# Patient Record
Sex: FEMALE | Race: WHITE | Employment: FULL TIME | URBAN - METROPOLITAN AREA
[De-identification: names, ages, dates, MRNs, and addresses within clinical notes are randomized per-mention and may not be internally consistent; named-entity substitution may affect disease eponyms.]

---

## 2017-02-28 ENCOUNTER — OP HISTORICAL/CONVERTED ENCOUNTER (OUTPATIENT)
Dept: OTHER | Age: 27
End: 2017-02-28

## 2017-03-10 ENCOUNTER — OP HISTORICAL/CONVERTED ENCOUNTER (OUTPATIENT)
Dept: OTHER | Age: 27
End: 2017-03-10

## 2018-04-19 ENCOUNTER — HOSPITAL ENCOUNTER (OUTPATIENT)
Age: 28
Setting detail: OUTPATIENT SURGERY
Discharge: HOME | End: 2018-04-19
Attending: INTERNAL MEDICINE | Admitting: INTERNAL MEDICINE
Payer: OTHER GOVERNMENT

## 2018-04-19 ENCOUNTER — ANESTHESIA (OUTPATIENT)
Dept: ENDOSCOPY | Age: 28
End: 2018-04-19
Payer: OTHER GOVERNMENT

## 2018-04-19 ENCOUNTER — ANESTHESIA EVENT (OUTPATIENT)
Dept: ENDOSCOPY | Age: 28
End: 2018-04-19
Payer: OTHER GOVERNMENT

## 2018-04-19 VITALS
DIASTOLIC BLOOD PRESSURE: 55 MMHG | OXYGEN SATURATION: 100 % | HEART RATE: 66 BPM | TEMPERATURE: 97.6 F | SYSTOLIC BLOOD PRESSURE: 105 MMHG | RESPIRATION RATE: 18 BRPM

## 2018-04-19 LAB
H PYLORI FROM TISSUE: NEGATIVE
HCG UR QL: NEGATIVE
KIT LOT NO., HCLOLOT: NORMAL
NEGATIVE CONTROL: NEGATIVE
POSITIVE CONTROL: POSITIVE

## 2018-04-19 PROCEDURE — 88305 TISSUE EXAM BY PATHOLOGIST: CPT | Performed by: INTERNAL MEDICINE

## 2018-04-19 PROCEDURE — 74011250636 HC RX REV CODE- 250/636

## 2018-04-19 PROCEDURE — 74011000250 HC RX REV CODE- 250

## 2018-04-19 PROCEDURE — 76060000031 HC ANESTHESIA FIRST 0.5 HR: Performed by: INTERNAL MEDICINE

## 2018-04-19 PROCEDURE — 77030009426 HC FCPS BIOP ENDOSC BSC -B: Performed by: INTERNAL MEDICINE

## 2018-04-19 PROCEDURE — 74011000258 HC RX REV CODE- 258

## 2018-04-19 PROCEDURE — 81025 URINE PREGNANCY TEST: CPT

## 2018-04-19 PROCEDURE — 87077 CULTURE AEROBIC IDENTIFY: CPT | Performed by: INTERNAL MEDICINE

## 2018-04-19 PROCEDURE — 76040000019: Performed by: INTERNAL MEDICINE

## 2018-04-19 RX ORDER — PROPOFOL 10 MG/ML
INJECTION, EMULSION INTRAVENOUS AS NEEDED
Status: DISCONTINUED | OUTPATIENT
Start: 2018-04-19 | End: 2018-04-19 | Stop reason: HOSPADM

## 2018-04-19 RX ORDER — FLUMAZENIL 0.1 MG/ML
0.2 INJECTION INTRAVENOUS
Status: DISCONTINUED | OUTPATIENT
Start: 2018-04-19 | End: 2018-04-19 | Stop reason: HOSPADM

## 2018-04-19 RX ORDER — EPINEPHRINE 0.1 MG/ML
1 INJECTION INTRACARDIAC; INTRAVENOUS
Status: DISCONTINUED | OUTPATIENT
Start: 2018-04-19 | End: 2018-04-19 | Stop reason: HOSPADM

## 2018-04-19 RX ORDER — CHOLECALCIFEROL (VITAMIN D3) 125 MCG
CAPSULE ORAL
COMMUNITY

## 2018-04-19 RX ORDER — DEXTROMETHORPHAN/PSEUDOEPHED 2.5-7.5/.8
1.2 DROPS ORAL
Status: DISCONTINUED | OUTPATIENT
Start: 2018-04-19 | End: 2018-04-19 | Stop reason: HOSPADM

## 2018-04-19 RX ORDER — SODIUM CHLORIDE 0.9 % (FLUSH) 0.9 %
5-10 SYRINGE (ML) INJECTION EVERY 8 HOURS
Status: DISCONTINUED | OUTPATIENT
Start: 2018-04-19 | End: 2018-04-19 | Stop reason: HOSPADM

## 2018-04-19 RX ORDER — NALOXONE HYDROCHLORIDE 0.4 MG/ML
0.4 INJECTION, SOLUTION INTRAMUSCULAR; INTRAVENOUS; SUBCUTANEOUS
Status: DISCONTINUED | OUTPATIENT
Start: 2018-04-19 | End: 2018-04-19 | Stop reason: HOSPADM

## 2018-04-19 RX ORDER — ACYCLOVIR 200 MG/1
200 CAPSULE ORAL
COMMUNITY

## 2018-04-19 RX ORDER — SODIUM CHLORIDE 9 MG/ML
INJECTION, SOLUTION INTRAVENOUS
Status: DISCONTINUED | OUTPATIENT
Start: 2018-04-19 | End: 2018-04-19 | Stop reason: HOSPADM

## 2018-04-19 RX ORDER — LIDOCAINE HYDROCHLORIDE 20 MG/ML
INJECTION, SOLUTION EPIDURAL; INFILTRATION; INTRACAUDAL; PERINEURAL AS NEEDED
Status: DISCONTINUED | OUTPATIENT
Start: 2018-04-19 | End: 2018-04-19 | Stop reason: HOSPADM

## 2018-04-19 RX ORDER — ATROPINE SULFATE 0.1 MG/ML
0.5 INJECTION INTRAVENOUS
Status: DISCONTINUED | OUTPATIENT
Start: 2018-04-19 | End: 2018-04-19 | Stop reason: HOSPADM

## 2018-04-19 RX ORDER — PAROXETINE HYDROCHLORIDE 20 MG/1
10 TABLET, FILM COATED ORAL DAILY
COMMUNITY

## 2018-04-19 RX ORDER — SODIUM CHLORIDE 0.9 % (FLUSH) 0.9 %
5-10 SYRINGE (ML) INJECTION AS NEEDED
Status: DISCONTINUED | OUTPATIENT
Start: 2018-04-19 | End: 2018-04-19 | Stop reason: HOSPADM

## 2018-04-19 RX ADMIN — LIDOCAINE HYDROCHLORIDE 60 MG: 20 INJECTION, SOLUTION EPIDURAL; INFILTRATION; INTRACAUDAL; PERINEURAL at 15:57

## 2018-04-19 RX ADMIN — PROPOFOL 50 MG: 10 INJECTION, EMULSION INTRAVENOUS at 15:58

## 2018-04-19 RX ADMIN — PROPOFOL 50 MG: 10 INJECTION, EMULSION INTRAVENOUS at 15:57

## 2018-04-19 RX ADMIN — PROPOFOL 50 MG: 10 INJECTION, EMULSION INTRAVENOUS at 15:59

## 2018-04-19 RX ADMIN — SODIUM CHLORIDE: 9 INJECTION, SOLUTION INTRAVENOUS at 15:48

## 2018-04-19 RX ADMIN — PROPOFOL 50 MG: 10 INJECTION, EMULSION INTRAVENOUS at 16:02

## 2018-04-19 NOTE — PROGRESS NOTES

## 2018-04-19 NOTE — H&P
118 [facility], [place].  217 [facility] 140 [facility], [address]   [phone]                                History and Physical     NAME: [place] Barker   :  1990   MRN:  [id number]     HPI:  The patient was seen and examined. Past Surgical History:   Procedure Laterality Date    HX SHOULDER ARTHROSCOPY Right 2017     Past Medical History:   Diagnosis Date    Anxiety     Oral herpes      Social History   Substance Use Topics    Smoking status: Never Smoker    Smokeless tobacco: Current User    Alcohol use No     Allergies   Allergen Reactions    Onion Anaphylaxis     History reviewed. No pertinent family history. No current facility-administered medications for this encounter. PHYSICAL EXAM:  General: Alert, cooperative, no acute distress    HEENT: NC, Atraumatic. PERRLA, EOMI. Anicteric sclerae. Lungs:  CTA Bilaterally  Heart:  Regular  Rate and Rhythm  Abdomen: Soft, Non distended, Non tender.  +Bowel sounds  Extremities: No edema  Neurologic:  CN 2-12 grossly intact, Alert and oriented X 3. No acute neurological distress   Psych:   Good insight. Not anxious nor agitated. The heart, lungs and mental status were satisfactory for the administration of sedation and for the procedure.       Mallampati score: 2       Assessment:   · Epigastric pain, nausea/vomiting    Plan:   · Endoscopic procedure  · MAC sedation   ·

## 2018-04-19 NOTE — IP AVS SNAPSHOT
[address] 
[phone] Patient: Yaakov Salazar MRN: [id number] EYX:4[birth year] About your hospitalization You were admitted on:  April 19, 2018 You last received care in the:  [facility] ENDOSCOPY You were discharged on:  April 19, 2018 Why you were hospitalized Your primary diagnosis was:  Not on File Follow-up Information Follow up With Details Comments Contact Info Eduard Other, MD   Patient can only remember the practice name and not the physician Discharge Orders None A check marcia indicates which time of day the medication should be taken. My Medications CONTINUE taking these medications Instructions Each Dose to Equal  
 Morning Noon Evening Bedtime  
 acyclovir 200 mg capsule Commonly known as:  ZOVIRAX Your last dose was: Your next dose is: Take 200 mg by mouth every four (4) hours (while awake). 200 mg PAXIL 20 mg tablet Generic drug:  PARoxetine Your last dose was: Your next dose is: Take 10 mg by mouth daily. 10 mg  
    
   
   
   
  
 VITAMIN D3 2,000 unit Tab Generic drug:  cholecalciferol (vitamin D3) Your last dose was: Your next dose is: Take  by mouth. Discharge Instructions [address] [place] [address]. 
217 [facility] 167 [address] [facility],  [address]  
[phone] DISCHARGE INSTRUCTIONS Yaakov Salazar 
[id number] 1990 DISCOMFORT: 
Sore throat- throat lozenges or warm salt water gargle 
redness at IV site- apply warm compress to area; if redness or soreness persist- contact your physician Gaseous discomfort- walking, belching will help relieve any discomfort You may not operate a vehicle for 12 hours You may not engage in an occupation involving machinery or appliances for rest of today You may not drink alcoholic beverages for at least 12 hours Avoid making any critical decisions for at least 24 hour DIET You may eat and drink after you leave. You may resume your regular diet  however -  remember your colon is empty and a heavy meal will produce gas. Avoid these foods:  vegetables, fried / greasy foods, carbonated drinks ACTIVITY You may resume your normal daily activities Spend the remainder of the day resting -  avoid any strenuous activity. CALL M.D. ANY SIGN OF Increasing pain, nausea, vomiting Abdominal distension (swelling) New increased bleeding (oral or rectal) Fever (chills) Pain in chest area Bloody discharge from nose or mouth Shortness of breath Follow-up Instructions: 
 Call Dr. Ana Arevalo for any questions or problems. If we took a biopsy please call the office within 2 weeks to discuss your                             pathology results. Telephone # [phone] Continue same medications. Post procedure diagnosis:  1. Esophagitis 2. Gastritis Introducing [facility]! Gaurav Sanz introduces AppInstitute patient portal. Now you can access parts of your medical record, email your doctor's office, and request medication refills online. 1. In your internet browser, go to [email or web address] 2. Click on the First Time User? Click Here link in the Sign In box. You will see the New Member Sign Up page. 3. Enter your AppInstitute Access Code exactly as it appears below. You will not need to use this code after youve completed the sign-up process. If you do not sign up before the expiration date, you must request a new code. · AppInstitute Access Code: [email or web address] Expires: 7/18/2018  4:25 PM 
 
4. Enter the last four digits of your Social Security Number (xxxx) and Date of Birth (mm/dd/yyyy) as indicated and click Submit. You will be taken to the next sign-up page. 5. Create a [employer] ID. This will be your Fathom Online login ID and cannot be changed, so think of one that is secure and easy to remember. 6. Create a Fathom Online password. You can change your password at any time. 7. Enter your Password Reset Question and Answer. This can be used at a later time if you forget your password. 8. Enter your e-mail address. You will receive e-mail notification when new information is available in [facility] [address] 9. Click Sign Up. You can now view and download portions of your medical record. 10. Click the Download Summary menu link to download a portable copy of your medical information. If you have questions, please visit the Frequently Asked Questions section of the Fathom Online website. Remember, Fathom Online is NOT to be used for urgent needs. For medical emergencies, dial 911. Now available from your iPhone and Android! Introducing Baltazar Benavides As a [facility] patient, I wanted to make you aware of our electronic visit tool called Baltazar Benavides. [email or web address] [facility] 24/7 allows you to connect within minutes with a medical provider 24 hours a day, seven days a week via a mobile device or tablet or logging into a secure website from your computer. You can access Baltazar Benavides from anywhere in the [place]. A virtual visit might be right for you when you have a simple condition and feel like you just dont want to get out of bed, or cant get away from work for an appointment, when your regular [facility] provider is not available (evenings, weekends or holidays), or when youre out of town and need minor care. Electronic visits cost only $49 and if the [email or web address] [facility] 24/7 provider determines a prescription is needed to treat your condition, one can be electronically transmitted to a nearby pharmacy*. Please take a moment to enroll today if you have not already done so. The enrollment process is free and takes just a few minutes.   To enroll, please download the [employer] 24/7 alhaji to your tablet or phone, or visit [email or web address] to enroll on your computer. And, as an [address] patient with a HotGrinds account, the results of your visits will be scanned into your electronic medical record and your primary care provider will be able to view the scanned results. We urge you to continue to see your regular Kineta provider for your ongoing medical care. And while your primary care provider may not be the one available when you seek a Baltazar Adspringrandrésfin virtual visit, the peace of mind you get from getting a real diagnosis real time can be priceless. For more information on Lanx, view our Frequently Asked Questions (FAQs) at [email or web address]. Sincerely, 
 
Nilesh Sarkar MD 
Chief Medical Officer [employer] *:  certain medications cannot be prescribed via Lanx Providers Seen During Your Hospitalization Provider Specialty Primary office phone Stella Stubbs MD Gastroenterology [phone] Your Primary Care Physician (PCP) Primary Care Physician Office Phone Office Fax OTHER, PHYS ** None ** ** None ** You are allergic to the following Allergen Reactions Onion Anaphylaxis Recent Documentation OB Status Smoking Status Having regular periods Never Smoker Emergency Contacts Name Discharge Info Relation Home Work Mobile Saadia Ruiz DISCHARGE CAREGIVER [3] Spouse [3]   [phone] Patient Belongings The following personal items are in your possession at time of discharge: 
  Dental Appliances: None  Visual Aid: Contacts (patient reports okay to leave in for procedure) Please provide this summary of care documentation to your next provider.  
  
  
 
  
Signatures-by signing, you are acknowledging that this After Visit Summary has been reviewed with you and you have received a copy. Patient Signature:  ____________________________________________________________ Date:  ____________________________________________________________  
  
Faiza Pane Provider Signature:  ____________________________________________________________ Date:  ____________________________________________________________

## 2018-04-19 NOTE — ANESTHESIA POSTPROCEDURE EVALUATION
Post-Anesthesia Evaluation and Assessment    Patient: Yuriy Durán MRN: [id number]  SSN: [id number]    YOB: 1990  Age: 33 y.o. Sex: female       Cardiovascular Function/Vital Signs  Visit Vitals    BP 96/51    Pulse 72    Temp 37 °C (98.6 °F)    Resp 19    SpO2 100%       Patient is status post MAC anesthesia for Procedure(s):  ESOPHAGOGASTRODUODENOSCOPY (EGD). Nausea/Vomiting: None    Postoperative hydration reviewed and adequate. Pain:  Pain Scale 1: Adult Nonverbal Pain Scale (04/19/18 1610)  Pain Intensity 1: 0 (04/19/18 1610)   Managed    Neurological Status: At baseline    Mental Status and Level of Consciousness: Arousable    Pulmonary Status:   O2 Device: Nasal cannula (04/19/18 1610)   Adequate oxygenation and airway patent    Complications related to anesthesia: None    Post-anesthesia assessment completed.  No concerns    Signed By: Morena Motley MD     April 19, 2018

## 2018-04-19 NOTE — PROCEDURES
[address]  174 [facility], [address]                 NAME:  Faraz Bhatti   :   1990   MRN:   [id number]     Date/Time:  2018 4:10 PM    Esophagogastroduodenoscopy (EGD) Procedure Note    : Dipak Le MD    Referring Provider:  Eduard Mishra MD    Anethesia/Sedation:  MAC anesthesia Propofol    Preoperative diagnosis: BLOATING, CONSTIPATION, EPIGASTRIC PAIN, LOWER ABDOMINAL PAIN    Postoperative diagnosis: 1. Esophagitis  2. Gastritis    Procedure Details     After infom consent was obtained for the procedure, with all risks and benefits of procedure explained the patient was taken to the endoscopy suite and placed in the left lateral decubitus position. Following sequential administration of sedation as per above, the [id number] gastroscope was inserted into the mouth and advanced under direct vision to second portion of the duodenum. A careful inspection was made as the gastroscope was withdrawn, including a retroflexed view of the proximal stomach; findings and interventions are described below. Findings:  Esophagus:Grade I esophagitis in the distal esophagus, biopsies taken (jar 2). Stomach:Mild erythema in the antrum, biopsies taken for ELLY. Duodenum/jejunum:normal, random biopsies taken (jar 1). Therapies:  none    Specimens: as listed above           EBL: None    Complications:   None; patient tolerated the procedure well. Impression:    1. Grade I Esophagitis  2. Gastritis    Recommendations:  -Acid suppression with a proton pump inhibitor -Omeprazole dose increased from 20mg to 40mg . , -Await pathology. , -Await ELLY test result and treat for Helicobacter pylori if positive. , -GERD diet: avoid fried and fatty foods. peppermint, chocolate, alcohol, coffee, citrus fruits and juices, tomoato products; avoid lying down for 2 to 3 hours after eating.     Discharge disposition:  Home in the company of  when able to ambulate    Adam Hernandez MD

## 2018-04-19 NOTE — IP AVS SNAPSHOT
Summary of Care Report The Summary of Care report has been created to help improve care coordination. Users with access to [employer] or [address] (Web-based application) may access additional patient information including the Discharge Summary. If you are not currently a [address] user and need more information, please call the number listed below in the [place] [address] section and ask to be connected with Medical Records. Facility Information Name Address Phone [address] [phone] Patient Information Patient Name Sex ROSALVA Mckeon ([id number]) Female 1990 Discharge Information Admitting Provider Service Area Unit Kiya Salgado MD / [phone] 8105 [facility] / [phone] Discharge Provider Discharge Date/Time Discharge Disposition Destination (none) 2018 (Pending) AHR (none) Patient Language Language ENGLISH [13] You are allergic to the following Allergen Reactions Onion Anaphylaxis Current Discharge Medication List  
  
CONTINUE these medications which have NOT CHANGED Dose & Instructions Dispensing Information Comments  
 acyclovir 200 mg capsule Commonly known as:  ZOVIRAX Dose:  200 mg Take 200 mg by mouth every four (4) hours (while awake). Refills:  0 PAXIL 20 mg tablet Generic drug:  PARoxetine Dose:  10 mg Take 10 mg by mouth daily. Refills:  0  
   
 VITAMIN D3 2,000 unit Tab Generic drug:  cholecalciferol (vitamin D3) Take  by mouth. Refills:  0 Surgery Information ID Date/Time Status Primary Surgeon All Procedures Location [id number] 2018 1530 Unposted Kiya Salgado MD ESOPHAGOGASTRODUODENOSCOPY (EGD) ESOPHAGOGASTRODUODENAL (EGD) BIOPSY [facility] ENDOSCOPY Follow-up Information Follow up With Details Comments Contact Info Eduard Mishra MD   Patient can only remember the practice name and not the physician Discharge Instructions [address] 
[address] [facility] [address] [place], [address] 
[phone] DISCHARGE INSTRUCTIONS [place] 
[id number] 1990 DISCOMFORT: 
Sore throat- throat lozenges or warm salt water gargle 
redness at IV site- apply warm compress to area; if redness or soreness persist- contact your physician Gaseous discomfort- walking, belching will help relieve any discomfort You may not operate a vehicle for 12 hours You may not engage in an occupation involving machinery or appliances for rest of today You may not drink alcoholic beverages for at least 12 hours Avoid making any critical decisions for at least 24 hour DIET You may eat and drink after you leave. You may resume your regular diet  however -  remember your colon is empty and a heavy meal will produce gas. Avoid these foods:  vegetables, fried / greasy foods, carbonated drinks ACTIVITY You may resume your normal daily activities Spend the remainder of the day resting -  avoid any strenuous activity. CALL M.D. ANY SIGN OF Increasing pain, nausea, vomiting Abdominal distension (swelling) New increased bleeding (oral or rectal) Fever (chills) Pain in chest area Bloody discharge from nose or mouth Shortness of breath Follow-up Instructions: 
 Call Dr. Mine Rankin for any questions or problems. If we took a biopsy please call the office within 2 weeks to discuss your                             pathology results. Telephone # [phone] Continue same medications. Post procedure diagnosis:  1. Esophagitis 2. Gastritis Chart Review Routing History No Routing History on File

## 2018-04-19 NOTE — DISCHARGE INSTRUCTIONS
118 [facility].  217 [facility] [address] [facility] Max Armstrong  [id number]  1990    DISCOMFORT:  Sore throat- throat lozenges or warm salt water gargle  redness at IV site- apply warm compress to area; if redness or soreness persist- contact your physician  Gaseous discomfort- walking, belching will help relieve any discomfort  You may not operate a vehicle for 12 hours  You may not engage in an occupation involving machinery or appliances for rest of today  You may not drink alcoholic beverages for at least 12 hours  Avoid making any critical decisions for at least 24 hour  DIET  You may eat and drink after you leave. You may resume your regular diet - however -  remember your colon is empty and a heavy meal will produce gas. Avoid these foods:  vegetables, fried / greasy foods, carbonated drinks    ACTIVITY  You may resume your normal daily activities   Spend the remainder of the day resting -  avoid any strenuous activity. CALL M.D. ANY SIGN OF   Increasing pain, nausea, vomiting  Abdominal distension (swelling)  New increased bleeding (oral or rectal)  Fever (chills)  Pain in chest area  Bloody discharge from nose or mouth  Shortness of breath    Follow-up Instructions:   Call Dr. Amira Mireles for any questions or problems. If we took a biopsy please call the office within 2 weeks to discuss your                             pathology results. Telephone # [phone]        Continue same medications. Post procedure diagnosis:  1. Esophagitis  2.  Gastritis

## 2018-04-20 ENCOUNTER — APPOINTMENT (OUTPATIENT)
Dept: GENERAL RADIOLOGY | Age: 28
End: 2018-04-20
Attending: NURSE PRACTITIONER
Payer: OTHER GOVERNMENT

## 2018-04-20 ENCOUNTER — HOSPITAL ENCOUNTER (EMERGENCY)
Age: 28
Discharge: HOME | End: 2018-04-20
Attending: EMERGENCY MEDICINE
Payer: OTHER GOVERNMENT

## 2018-04-20 VITALS
DIASTOLIC BLOOD PRESSURE: 71 MMHG | OXYGEN SATURATION: 96 % | SYSTOLIC BLOOD PRESSURE: 111 MMHG | BODY MASS INDEX: 18.95 KG/M2 | HEART RATE: 82 BPM | TEMPERATURE: 97.9 F | RESPIRATION RATE: 18 BRPM | WEIGHT: 94 LBS | HEIGHT: 59 IN

## 2018-04-20 DIAGNOSIS — R11.2 NAUSEA AND VOMITING, INTRACTABILITY OF VOMITING NOT SPECIFIED, UNSPECIFIED VOMITING TYPE: Primary | ICD-10-CM

## 2018-04-20 DIAGNOSIS — R10.84 ABDOMINAL PAIN, GENERALIZED: ICD-10-CM

## 2018-04-20 LAB
ALBUMIN SERPL-MCNC: 3.9 G/DL (ref 3.5–5)
ALBUMIN/GLOB SERPL: 1.1 {RATIO} (ref 1.1–2.2)
ALP SERPL-CCNC: 76 U/L (ref 45–117)
ALT SERPL-CCNC: 19 U/L (ref 12–78)
ANION GAP SERPL CALC-SCNC: 7 MMOL/L (ref 5–15)
APPEARANCE UR: CLEAR
AST SERPL-CCNC: 12 U/L (ref 15–37)
BACTERIA URNS QL MICRO: NEGATIVE /HPF
BASOPHILS # BLD: 0 K/UL (ref 0–0.1)
BASOPHILS NFR BLD: 1 % (ref 0–1)
BILIRUB SERPL-MCNC: 0.2 MG/DL (ref 0.2–1)
BILIRUB UR QL: NEGATIVE
BUN SERPL-MCNC: 6 MG/DL (ref 6–20)
BUN/CREAT SERPL: 9 (ref 12–20)
CALCIUM SERPL-MCNC: 8.6 MG/DL (ref 8.5–10.1)
CHLORIDE SERPL-SCNC: 105 MMOL/L (ref 97–108)
CO2 SERPL-SCNC: 26 MMOL/L (ref 21–32)
COLOR UR: NORMAL
CREAT SERPL-MCNC: 0.68 MG/DL (ref 0.55–1.02)
DIFFERENTIAL METHOD BLD: NORMAL
EOSINOPHIL # BLD: 0.1 K/UL (ref 0–0.4)
EOSINOPHIL NFR BLD: 1 % (ref 0–7)
EPITH CASTS URNS QL MICRO: NORMAL /LPF
ERYTHROCYTE [DISTWIDTH] IN BLOOD BY AUTOMATED COUNT: 12.5 % (ref 11.5–14.5)
GLOBULIN SER CALC-MCNC: 3.5 G/DL (ref 2–4)
GLUCOSE SERPL-MCNC: 90 MG/DL (ref 65–100)
GLUCOSE UR STRIP.AUTO-MCNC: NEGATIVE MG/DL
HCG UR QL: NEGATIVE
HCT VFR BLD AUTO: 39.4 % (ref 35–47)
HGB BLD-MCNC: 13.2 G/DL (ref 11.5–16)
HGB UR QL STRIP: NEGATIVE
HYALINE CASTS URNS QL MICRO: NORMAL /LPF (ref 0–5)
IMM GRANULOCYTES # BLD: 0 K/UL (ref 0–0.04)
IMM GRANULOCYTES NFR BLD AUTO: 0 % (ref 0–0.5)
KETONES UR QL STRIP.AUTO: NEGATIVE MG/DL
LEUKOCYTE ESTERASE UR QL STRIP.AUTO: NEGATIVE
LIPASE SERPL-CCNC: 141 U/L (ref 73–393)
LYMPHOCYTES # BLD: 2.2 K/UL (ref 0.8–3.5)
LYMPHOCYTES NFR BLD: 31 % (ref 12–49)
MCH RBC QN AUTO: 29.8 PG (ref 26–34)
MCHC RBC AUTO-ENTMCNC: 33.5 G/DL (ref 30–36.5)
MCV RBC AUTO: 88.9 FL (ref 80–99)
MONOCYTES # BLD: 0.6 K/UL (ref 0–1)
MONOCYTES NFR BLD: 8 % (ref 5–13)
NEUTS SEG # BLD: 4.4 K/UL (ref 1.8–8)
NEUTS SEG NFR BLD: 60 % (ref 32–75)
NITRITE UR QL STRIP.AUTO: NEGATIVE
NRBC # BLD: 0 K/UL (ref 0–0.01)
NRBC BLD-RTO: 0 PER 100 WBC
PH UR STRIP: 5.5 [PH] (ref 5–8)
PLATELET # BLD AUTO: 228 K/UL (ref 150–400)
PMV BLD AUTO: 9.8 FL (ref 8.9–12.9)
POTASSIUM SERPL-SCNC: 3.9 MMOL/L (ref 3.5–5.1)
PROT SERPL-MCNC: 7.4 G/DL (ref 6.4–8.2)
PROT UR STRIP-MCNC: NEGATIVE MG/DL
RBC # BLD AUTO: 4.43 M/UL (ref 3.8–5.2)
RBC #/AREA URNS HPF: NORMAL /HPF (ref 0–5)
SODIUM SERPL-SCNC: 138 MMOL/L (ref 136–145)
SP GR UR REFRACTOMETRY: 1.03 (ref 1–1.03)
UR CULT HOLD, URHOLD: NORMAL
UROBILINOGEN UR QL STRIP.AUTO: 0.2 EU/DL (ref 0.2–1)
WBC # BLD AUTO: 7.3 K/UL (ref 3.6–11)
WBC URNS QL MICRO: NORMAL /HPF (ref 0–4)

## 2018-04-20 PROCEDURE — 81001 URINALYSIS AUTO W/SCOPE: CPT | Performed by: NURSE PRACTITIONER

## 2018-04-20 PROCEDURE — 74011250637 HC RX REV CODE- 250/637: Performed by: EMERGENCY MEDICINE

## 2018-04-20 PROCEDURE — 99284 EMERGENCY DEPT VISIT MOD MDM: CPT

## 2018-04-20 PROCEDURE — 83690 ASSAY OF LIPASE: CPT | Performed by: NURSE PRACTITIONER

## 2018-04-20 PROCEDURE — 36415 COLL VENOUS BLD VENIPUNCTURE: CPT | Performed by: NURSE PRACTITIONER

## 2018-04-20 PROCEDURE — 81025 URINE PREGNANCY TEST: CPT

## 2018-04-20 PROCEDURE — 85025 COMPLETE CBC W/AUTO DIFF WBC: CPT | Performed by: NURSE PRACTITIONER

## 2018-04-20 PROCEDURE — 80053 COMPREHEN METABOLIC PANEL: CPT | Performed by: NURSE PRACTITIONER

## 2018-04-20 RX ORDER — ONDANSETRON 4 MG/1
4 TABLET, ORALLY DISINTEGRATING ORAL
Status: COMPLETED | OUTPATIENT
Start: 2018-04-20 | End: 2018-04-20

## 2018-04-20 RX ORDER — FAMOTIDINE 20 MG/1
20 TABLET, FILM COATED ORAL
Status: COMPLETED | OUTPATIENT
Start: 2018-04-20 | End: 2018-04-20

## 2018-04-20 RX ORDER — FAMOTIDINE 20 MG/1
20 TABLET, FILM COATED ORAL 2 TIMES DAILY
Qty: 20 TAB | Refills: 0 | Status: SHIPPED | OUTPATIENT
Start: 2018-04-20 | End: 2018-04-30

## 2018-04-20 RX ORDER — ONDANSETRON 4 MG/1
4 TABLET, ORALLY DISINTEGRATING ORAL
Qty: 8 TAB | Refills: 0 | Status: SHIPPED | OUTPATIENT
Start: 2018-04-20

## 2018-04-20 RX ADMIN — FAMOTIDINE 20 MG: 20 TABLET, FILM COATED ORAL at 15:52

## 2018-04-20 RX ADMIN — ONDANSETRON 4 MG: 4 TABLET, ORALLY DISINTEGRATING ORAL at 15:52

## 2018-04-20 NOTE — ED PROVIDER NOTES
HPI Comments: 33 y.o. female with past medical history significant for oral herpes and anxiety who presents from home with chief complaint of abdominal pain. Pt reports she had an endoscopy yesterday by Dr. Lisa Mendez. She began having N/V last night w/ 9/10 abdominal pain today. Pt notes she has hx of gastric issues; if she begins vomiting she usually does not stop without being evaluated. She ate food earlier today w/o vomiting, but it worsened her abdominal pain. Last vomiting episode was 5 hours ago. Pt called endoscopy earlier today who recommended ED evaluation. Pt has not taken any medications. Pt denies diarrhea and hematemesis. There are no other acute medical concerns at this time. Note written by Marco Antonio Chinchilla, as dictated by Miriam Nava MD 3:33 PM    The history is provided by the patient. Past Medical History:   Diagnosis Date    Anxiety     Oral herpes        Past Surgical History:   Procedure Laterality Date    HX SHOULDER ARTHROSCOPY Right 2017         History reviewed. No pertinent family history. Social History     Social History    Marital status:      Spouse name: N/A    Number of children: N/A    Years of education: N/A     Occupational History    Not on file. Social History Main Topics    Smoking status: Never Smoker    Smokeless tobacco: Current User    Alcohol use No    Drug use: No    Sexual activity: Not on file     Other Topics Concern    Not on file     Social History Narrative         ALLERGIES: Onion    Review of Systems   Constitutional: Negative for fever. HENT: Negative for facial swelling. Eyes: Negative for visual disturbance. Respiratory: Negative for chest tightness. Cardiovascular: Negative for chest pain. Gastrointestinal: Positive for abdominal pain, nausea and vomiting. Negative for diarrhea. Genitourinary: Negative for dysuria. Musculoskeletal: Negative for arthralgias. Skin: Negative for rash.    Neurological: Negative for dizziness. Hematological: Negative for adenopathy. Psychiatric/Behavioral: Negative for suicidal ideas. All other systems reviewed and are negative. Vitals:    04/20/18 1354   BP: 111/71   Pulse: 82   Resp: 18   Temp: 97.9 °F (36.6 °C)   SpO2: 96%   Weight: 42.6 kg (94 lb)   Height: 4' 11\" (1.499 m)            Physical Exam   Constitutional: She is oriented to person, place, and time. She appears well-developed and well-nourished. No distress. HENT:   Head: Normocephalic and atraumatic. Mouth/Throat: Oropharynx is clear and moist.   Eyes: Pupils are equal, round, and reactive to light. No scleral icterus. Neck: Normal range of motion. Neck supple. No thyromegaly present. Cardiovascular: Normal rate, regular rhythm, normal heart sounds and intact distal pulses. No murmur heard. Pulmonary/Chest: Effort normal and breath sounds normal. No respiratory distress. Abdominal: Soft. Bowel sounds are normal. She exhibits no distension. There is no tenderness. Musculoskeletal: Normal range of motion. She exhibits no edema. Neurological: She is alert and oriented to person, place, and time. Skin: Skin is warm and dry. No rash noted. She is not diaphoretic. Nursing note and vitals reviewed. Note written by Piedad Krabbe, Scribe, as dictated by Myra Aguilar MD 3:33 PM    [facility]      ED Course       Procedures    A:  Nausea and vomiting following endoscopy yesterday. VS normal.  Exam unremarkable with no focal abd ttp. Labs unremarkable. Lipase normal.    P:  Zofran/pepcid  F/u with Dr. Fiordaliza Tapia.

## 2018-04-20 NOTE — ED NOTES
The MD has reviewed discharge instructions with the patient. The patient verbalized understanding. Primary nurse went to discharge patient, patient had been given discharge instructions by MD and had left. Called and spoke with patient to verify IV had been removed prior to discharge as I did not remove it, patient states she does NOT have an IV in her arm.

## 2018-04-20 NOTE — ED TRIAGE NOTES
Patient had endoscopy yesterday, n/v and epigastric pain. Denies blood in vomit, denies diarrhea/fever/chills. Dr Ebony Bailon performed endoscopy.

## 2018-04-20 NOTE — DISCHARGE INSTRUCTIONS
Abdominal Pain: Care Instructions  Your Care Instructions    Abdominal pain has many possible causes. Some aren't serious and get better on their own in a few days. Others need more testing and treatment. If your pain continues or gets worse, you need to be rechecked and may need more tests to find out what is wrong. You may need surgery to correct the problem. Don't ignore new symptoms, such as fever, nausea and vomiting, urination problems, pain that gets worse, and dizziness. These may be signs of a more serious problem. Your doctor may have recommended a follow-up visit in the next 8 to 12 hours. If you are not getting better, you may need more tests or treatment. The doctor has checked you carefully, but problems can develop later. If you notice any problems or new symptoms, get medical treatment right away. Follow-up care is a key part of your treatment and safety. Be sure to make and go to all appointments, and call your doctor if you are having problems. It's also a good idea to know your test results and keep a list of the medicines you take. How can you care for yourself at home? · Rest until you feel better. · To prevent dehydration, drink plenty of fluids, enough so that your urine is light yellow or clear like water. Choose water and other caffeine-free clear liquids until you feel better. If you have kidney, heart, or liver disease and have to limit fluids, talk with your doctor before you increase the amount of fluids you drink. · If your stomach is upset, eat mild foods, such as rice, dry toast or crackers, bananas, and applesauce. Try eating several small meals instead of two or three large ones. · Wait until 48 hours after all symptoms have gone away before you have spicy foods, alcohol, and drinks that contain caffeine. · Do not eat foods that are high in fat. · Avoid anti-inflammatory medicines such as aspirin, ibuprofen (Advil, Motrin), and naproxen (Aleve).  These can cause stomach upset. Talk to your doctor if you take daily aspirin for another health problem. When should you call for help? Call 911 anytime you think you may need emergency care. For example, call if:  ? · You passed out (lost consciousness). ? · You pass maroon or very bloody stools. ? · You vomit blood or what looks like coffee grounds. ? · You have new, severe belly pain. ?Call your doctor now or seek immediate medical care if:  ? · Your pain gets worse, especially if it becomes focused in one area of your belly. ? · You have a new or higher fever. ? · Your stools are black and look like tar, or they have streaks of blood. ? · You have unexpected vaginal bleeding. ? · You have symptoms of a urinary tract infection. These may include:  ¨ Pain when you urinate. ¨ Urinating more often than usual.  ¨ Blood in your urine. ? · You are dizzy or lightheaded, or you feel like you may faint. ? Watch closely for changes in your health, and be sure to contact your doctor if:  ? · You are not getting better after 1 day (24 hours). Where can you learn more? Go to http://kaylaEarshotjoe.info/. Enter W337 in the search box to learn more about \"Abdominal Pain: Care Instructions. \"  Current as of: March 20, 2017  Content Version: 11.4  © 4940-1450 [employer]. Care instructions adapted under license by [employer] (which disclaims liability or warranty for this information). If you have questions about a medical condition or this instruction, always ask your healthcare professional. [place] [address] any warranty or liability for your use of this information. Nausea and Vomiting: Care Instructions  Your Care Instructions    When you are nauseated, you may feel weak and sweaty and notice a lot of saliva in your mouth. Nausea often leads to vomiting.  Most of the time you do not need to worry about nausea and vomiting, but they can be signs of other illnesses. Two common causes of nausea and vomiting are stomach flu and food poisoning. Nausea and vomiting from viral stomach flu will usually start to improve within 24 hours. Nausea and vomiting from food poisoning may last from 12 to 48 hours. The doctor has checked you carefully, but problems can develop later. If you notice any problems or new symptoms, get medical treatment right away. Follow-up care is a key part of your treatment and safety. Be sure to make and go to all appointments, and call your doctor if you are having problems. It's also a good idea to know your test results and keep a list of the medicines you take. How can you care for yourself at home? · To prevent dehydration, drink plenty of fluids, enough so that your urine is light yellow or clear like water. Choose water and other caffeine-free clear liquids until you feel better. If you have kidney, heart, or liver disease and have to limit fluids, talk with your doctor before you increase the amount of fluids you drink. · Rest in bed until you feel better. · When you are able to eat, try clear soups, mild foods, and liquids until all symptoms are gone for 12 to 48 hours. Other good choices include dry toast, crackers, cooked cereal, and gelatin dessert, such as Jell-O. When should you call for help? Call 911 anytime you think you may need emergency care. For example, call if:  ? · You passed out (lost consciousness). ?Call your doctor now or seek immediate medical care if:  ? · You have symptoms of dehydration, such as:  ¨ Dry eyes and a dry mouth. ¨ Passing only a little dark urine. ¨ Feeling thirstier than usual.   ? · You have new or worsening belly pain. ? · You have a new or higher fever. ? · You vomit blood or what looks like coffee grounds. ? Watch closely for changes in your health, and be sure to contact your doctor if:  ? · You have ongoing nausea and vomiting. ? · Your vomiting is getting worse.    ? · Your vomiting lasts longer than 2 days. ? · You are not getting better as expected. Where can you learn more? Go to http://kayla-joe.info/. Enter 25 [id number] in the search box to learn more about \"Nausea and Vomiting: Care Instructions. \"  Current as of: March 20, 2017  Content Version: 11.4  © 2073-4817 [employer]. Care instructions adapted under license by [employer] (which disclaims liability or warranty for this information). If you have questions about a medical condition or this instruction, always ask your healthcare professional. [place] [address] any warranty or liability for your use of this information. We hope that we have addressed all of your medical concerns. The examination and treatment you received in the Emergency Department were for an emergent problem and were not intended as complete care. It is important that you follow up with your healthcare provider(s) for ongoing care. If your symptoms worsen or do not improve as expected, and you are unable to reach your usual health care provider(s), you should return to the Emergency Department. Today's healthcare is undergoing tremendous change, and patient satisfaction surveys are one of the many tools to assess the quality of medical care. You may receive a survey from the [employer] regarding your experience in the Emergency Department. I hope that your experience has been completely positive, particularly the medical care that I provided. As such, please participate in the survey; anything less than excellent does not meet my expectations or intentions. [facility] and [employer] participate in nationally recognized quality of care measures.   If your blood pressure is greater than 120/80, as reported below, we urge that you seek medical care to address the potential of high blood pressure, commonly known as hypertension. Hypertension can be hereditary or can be caused by certain medical conditions, pain, stress, or \"white coat syndrome. \"       Please make an appointment with your health care provider(s) for follow up of your Emergency Department visit. VITALS:   Patient Vitals for the past 8 hrs:   Temp Pulse Resp BP SpO2   04/20/18 1354 97.9 °F (36.6 °C) 82 18 111/71 96 %          Thank you for allowing us to provide you with medical care today. We realize that you have many choices for your emergency care needs. Please choose us in the future for any continued health care needs. Josh Fox MD    [facility], [facility].   Office: [phone]            Recent Results (from the past 24 hour(s))   CBC WITH AUTOMATED DIFF    Collection Time: 04/20/18  2:04 PM   Result Value Ref Range    WBC 7.3 3.6 - 11.0 K/uL    RBC 4.43 3.80 - 5.20 M/uL    HGB 13.2 11.5 - 16.0 g/dL    HCT 39.4 35.0 - 47.0 %    MCV 88.9 80.0 - 99.0 FL    MCH 29.8 26.0 - 34.0 PG    MCHC 33.5 30.0 - 36.5 g/dL    RDW 12.5 11.5 - 14.5 %    PLATELET 730 296 - 294 K/uL    MPV 9.8 8.9 - 12.9 FL    NRBC 0.0 0  WBC    ABSOLUTE NRBC 0.00 0.00 - 0.01 K/uL    NEUTROPHILS 60 32 - 75 %    LYMPHOCYTES 31 12 - 49 %    MONOCYTES 8 5 - 13 %    EOSINOPHILS 1 0 - 7 %    BASOPHILS 1 0 - 1 %    IMMATURE GRANULOCYTES 0 0.0 - 0.5 %    ABS. NEUTROPHILS 4.4 1.8 - 8.0 K/UL    ABS. LYMPHOCYTES 2.2 0.8 - 3.5 K/UL    ABS. MONOCYTES 0.6 0.0 - 1.0 K/UL    ABS. EOSINOPHILS 0.1 0.0 - 0.4 K/UL    ABS. BASOPHILS 0.0 0.0 - 0.1 K/UL    ABS. IMM.  GRANS. 0.0 0.00 - 0.04 K/UL    DF AUTOMATED     METABOLIC PANEL, COMPREHENSIVE    Collection Time: 04/20/18  2:04 PM   Result Value Ref Range    Sodium 138 136 - 145 mmol/L    Potassium 3.9 3.5 - 5.1 mmol/L    Chloride 105 97 - 108 mmol/L    CO2 26 21 - 32 mmol/L    Anion gap 7 5 - 15 mmol/L    Glucose 90 65 - 100 mg/dL    BUN 6 6 - 20 MG/DL    Creatinine 0.68 0.55 - 1.02 MG/DL BUN/Creatinine ratio 9 (L) 12 - 20      GFR est AA >60 >60 ml/min/1.73m2    GFR est non-AA >60 >60 ml/min/1.73m2    Calcium 8.6 8.5 - 10.1 MG/DL    Bilirubin, total 0.2 0.2 - 1.0 MG/DL    ALT (SGPT) 19 12 - 78 U/L    AST (SGOT) 12 (L) 15 - 37 U/L    Alk. phosphatase 76 45 - 117 U/L    Protein, total 7.4 6.4 - 8.2 g/dL    Albumin 3.9 3.5 - 5.0 g/dL    Globulin 3.5 2.0 - 4.0 g/dL    A-G Ratio 1.1 1.1 - 2.2     LIPASE    Collection Time: 04/20/18  2:04 PM   Result Value Ref Range    Lipase 141 73 - 393 U/L   URINALYSIS W/MICROSCOPIC    Collection Time: 04/20/18  2:04 PM   Result Value Ref Range    Color YELLOW/STRAW      Appearance CLEAR CLEAR      Specific gravity 1.028 1.003 - 1.030      pH (UA) 5.5 5.0 - 8.0      Protein NEGATIVE  NEG mg/dL    Glucose NEGATIVE  NEG mg/dL    Ketone NEGATIVE  NEG mg/dL    Bilirubin NEGATIVE  NEG      Blood NEGATIVE  NEG      Urobilinogen 0.2 0.2 - 1.0 EU/dL    Nitrites NEGATIVE  NEG      Leukocyte Esterase NEGATIVE  NEG      WBC 0-4 0 - 4 /hpf    RBC 0-5 0 - 5 /hpf    Epithelial cells FEW FEW /lpf    Bacteria NEGATIVE  NEG /hpf    Hyaline cast 0-2 0 - 5 /lpf   URINE CULTURE HOLD SAMPLE    Collection Time: 04/20/18  2:04 PM   Result Value Ref Range    Urine culture hold        URINE ON HOLD IN MICROBIOLOGY DEPT FOR 3 DAYS. IF UNPRESERVED URINE IS SUBMITTED, IT CANNOT BE USED FOR ADDITIONAL TESTING AFTER 24 HRS, RECOLLECTION WILL BE REQUIRED. HCG URINE, QL. - POC    Collection Time: 04/20/18  2:05 PM   Result Value Ref Range    Pregnancy test,urine (POC) NEGATIVE  NEG         No results found.

## 2019-01-11 ENCOUNTER — ED HISTORICAL/CONVERTED ENCOUNTER (OUTPATIENT)
Dept: OTHER | Age: 29
End: 2019-01-11

## 2020-03-09 ENCOUNTER — ED HISTORICAL/CONVERTED ENCOUNTER (OUTPATIENT)
Dept: OTHER | Age: 30
End: 2020-03-09

## 2020-09-08 ENCOUNTER — ED HISTORICAL/CONVERTED ENCOUNTER (OUTPATIENT)
Dept: OTHER | Age: 30
End: 2020-09-08

## 2020-11-22 ENCOUNTER — APPOINTMENT (OUTPATIENT)
Dept: CT IMAGING | Age: 30
End: 2020-11-22
Attending: STUDENT IN AN ORGANIZED HEALTH CARE EDUCATION/TRAINING PROGRAM

## 2020-11-22 ENCOUNTER — HOSPITAL ENCOUNTER (EMERGENCY)
Age: 30
Discharge: HOME | End: 2020-11-22

## 2020-11-22 VITALS
HEIGHT: 59 IN | TEMPERATURE: 97.9 F | SYSTOLIC BLOOD PRESSURE: 110 MMHG | BODY MASS INDEX: 18.75 KG/M2 | RESPIRATION RATE: 18 BRPM | DIASTOLIC BLOOD PRESSURE: 67 MMHG | WEIGHT: 93 LBS | HEART RATE: 65 BPM | OXYGEN SATURATION: 100 %

## 2020-11-22 DIAGNOSIS — S09.90XA INJURY OF HEAD, INITIAL ENCOUNTER: Primary | ICD-10-CM

## 2020-11-22 DIAGNOSIS — R51.9 ACUTE INTRACTABLE HEADACHE, UNSPECIFIED HEADACHE TYPE: ICD-10-CM

## 2020-11-22 PROCEDURE — 74011250636 HC RX REV CODE- 250/636: Performed by: NURSE PRACTITIONER

## 2020-11-22 PROCEDURE — 99283 EMERGENCY DEPT VISIT LOW MDM: CPT

## 2020-11-22 PROCEDURE — 96374 THER/PROPH/DIAG INJ IV PUSH: CPT

## 2020-11-22 PROCEDURE — 96375 TX/PRO/DX INJ NEW DRUG ADDON: CPT

## 2020-11-22 PROCEDURE — 70450 CT HEAD/BRAIN W/O DYE: CPT

## 2020-11-22 RX ORDER — DEXAMETHASONE SODIUM PHOSPHATE 10 MG/ML
10 INJECTION INTRAMUSCULAR; INTRAVENOUS ONCE
Status: COMPLETED | OUTPATIENT
Start: 2020-11-22 | End: 2020-11-22

## 2020-11-22 RX ORDER — KETOROLAC TROMETHAMINE 10 MG/1
10 TABLET, FILM COATED ORAL
Qty: 20 TAB | Refills: 0 | Status: SHIPPED | OUTPATIENT
Start: 2020-11-22 | End: 2020-11-27

## 2020-11-22 RX ORDER — KETOROLAC TROMETHAMINE 30 MG/ML
30 INJECTION, SOLUTION INTRAMUSCULAR; INTRAVENOUS
Status: COMPLETED | OUTPATIENT
Start: 2020-11-22 | End: 2020-11-22

## 2020-11-22 RX ORDER — CYCLOBENZAPRINE HCL 5 MG
5 TABLET ORAL
Qty: 20 TAB | Refills: 0 | Status: SHIPPED | OUTPATIENT
Start: 2020-11-22 | End: 2020-12-02

## 2020-11-22 RX ORDER — ONDANSETRON 2 MG/ML
4 INJECTION INTRAMUSCULAR; INTRAVENOUS
Status: COMPLETED | OUTPATIENT
Start: 2020-11-22 | End: 2020-11-22

## 2020-11-22 RX ORDER — DIPHENHYDRAMINE HYDROCHLORIDE 50 MG/ML
25 INJECTION, SOLUTION INTRAMUSCULAR; INTRAVENOUS ONCE
Status: COMPLETED | OUTPATIENT
Start: 2020-11-22 | End: 2020-11-22

## 2020-11-22 RX ADMIN — DIPHENHYDRAMINE HYDROCHLORIDE 25 MG: 50 INJECTION, SOLUTION INTRAMUSCULAR; INTRAVENOUS at 11:15

## 2020-11-22 RX ADMIN — DEXAMETHASONE SODIUM PHOSPHATE 10 MG: 10 INJECTION, SOLUTION INTRAMUSCULAR; INTRAVENOUS at 11:41

## 2020-11-22 RX ADMIN — SODIUM CHLORIDE 1000 ML: 9 INJECTION, SOLUTION INTRAVENOUS at 11:36

## 2020-11-22 RX ADMIN — KETOROLAC TROMETHAMINE 30 MG: 30 INJECTION, SOLUTION INTRAMUSCULAR at 11:40

## 2020-11-22 RX ADMIN — ONDANSETRON 4 MG: 2 INJECTION INTRAMUSCULAR; INTRAVENOUS at 11:41

## 2020-11-22 NOTE — ED NOTES
Patient reports that a few days ago she fell over dogs & hit the back of her head/neck on the ground. Denies LOC. Reports headache with nausea/vomiting. Denies visual disturbance. Also reports that she is having problems with her wisdom teeth as well but unable to find dentist to remove them.

## 2020-11-22 NOTE — ED TRIAGE NOTES
GCS 15 pt stated that Thursday night she fell over her dogs and hit her head on the ground; since then she has been having a severe HA, vomiting, and a little balnce issue wth ambulation

## 2020-11-22 NOTE — LETTER
[facility] EMERGENCY DEPT 
[facility] [address] [place] [address] 
[phone] Work/School Note Date: 11/22/2020 To Whom It May concern: 
 
Ady Manzano was seen and treated today in the emergency room by the following provider(s): 
Nurse Practitioner: Chilango Villa NP. Ady Manzano is excused from work/school on 11/22/2020 through 11/25/2020. She is medically clear to return to work/school on 11/26/2020.   
  
 
Sincerely, 
 
 
 
 
Aleksandr Hidalgo NP

## 2020-11-22 NOTE — ED PROVIDER NOTES
EMERGENCY DEPARTMENT HISTORY AND PHYSICAL EXAM      Date: 11/22/2020  Patient Name: Heydi Liao    History of Presenting Illness     Chief Complaint   Patient presents with    Headache    Vomiting       History Provided By: Patient    HPI: Heydi Liao, 35 y.o. female with a past medical history significant No significant past medical history presents to the ED with cc of fall 2 days ago and hit posterior head. -loc. Headache since then, not being relieved with tylenol or motrin. There are no other complaints, changes, or physical findings at this time. PCP: None    No current facility-administered medications on file prior to encounter. Current Outpatient Medications on File Prior to Encounter   Medication Sig Dispense Refill    ondansetron (ZOFRAN ODT) 4 mg disintegrating tablet Take 1 Tab by mouth every eight (8) hours as needed for Nausea. 8 Tab 0    PARoxetine (PAXIL) 20 mg tablet Take 10 mg by mouth daily.  acyclovir (ZOVIRAX) 200 mg capsule Take 200 mg by mouth every four (4) hours (while awake).  cholecalciferol, vitamin D3, (VITAMIN D3) 2,000 unit tab Take  by mouth. Past History     Past Medical History:  Past Medical History:   Diagnosis Date    Anxiety     Oral herpes        Past Surgical History:  Past Surgical History:   Procedure Laterality Date    HX SHOULDER ARTHROSCOPY Right 2017       Family History:  No family history on file. Social History:  Social History     Tobacco Use    Smoking status: Never Smoker    Smokeless tobacco: Current User   Substance Use Topics    Alcohol use: No    Drug use: No       Allergies: Allergies   Allergen Reactions    Onion Anaphylaxis         Review of Systems     Review of Systems   Constitutional: Negative. HENT: Negative. Respiratory: Negative. Cardiovascular: Negative. Gastrointestinal: Negative. Negative for nausea and vomiting. Genitourinary: Negative. Musculoskeletal: Negative.     Neurological: Positive for seizures and headaches. Negative for dizziness, syncope and speech difficulty. Physical Exam     Physical Exam  Constitutional:       Appearance: Normal appearance. She is normal weight. HENT:      Head: Normocephalic and atraumatic. Eyes:      General: No visual field deficit. Extraocular Movements: Extraocular movements intact. Pupils: Pupils are equal, round, and reactive to light. Cardiovascular:      Rate and Rhythm: Normal rate and regular rhythm. Pulses: Normal pulses. Heart sounds: Normal heart sounds. Pulmonary:      Effort: Pulmonary effort is normal.      Breath sounds: Normal breath sounds. Abdominal:      General: Abdomen is flat. Musculoskeletal: Normal range of motion. Skin:     General: Skin is warm and dry. Neurological:      General: No focal deficit present. Mental Status: She is alert and oriented to person, place, and time. GCS: GCS eye subscore is 4. GCS verbal subscore is 5. GCS motor subscore is 6. Cranial Nerves: No cranial nerve deficit, dysarthria or facial asymmetry. Sensory: No sensory deficit. Motor: No weakness or tremor. Coordination: Coordination normal.      Gait: Gait normal.   Psychiatric:         Mood and Affect: Mood normal.         Behavior: Behavior normal.         Lab and Diagnostic Study Results     Labs -   No results found for this or any previous visit (from the past 12 hour(s)). Radiologic Studies -   @lastxrresult@  CT Results  (Last 48 hours)               11/22/20 1122  CT HEAD WO CONT Final result    Impression:  Impression:   No acute intracranial pathology. Narrative:  Head CT. Comparison: None. Technique: Unenhanced CT images of the brain are obtained. Multiplanar   reformatted images are reviewed.        Dose Reduction: All CT scans at this facility are performed using dose reduction   optimization techniques as appropriate to a performed exam including the following: Automated exposure control, adjustments of the mA and/or kV according   to patient size, or use of iterative reconstruction technique. Findings: The sulci, gyri and ventricular system are appropriate for age. The   gray-white matter differentiation is normal. The basal cisterns are clear. There   is no evidence of mass, hemorrhage, large territorial infarct or extra-axial   fluid collection. The intraorbital contents are intact. There is no evidence of   fracture. The visualized paranasal sinuses and mastoid air cells are clear. CXR Results  (Last 48 hours)    None            Medical Decision Making   - I am the first provider for this patient. - I reviewed the vital signs, available nursing notes, past medical history, past surgical history, family history and social history. - Initial assessment performed. The patients presenting problems have been discussed, and they are in agreement with the care plan formulated and outlined with them. I have encouraged them to ask questions as they arise throughout their visit. Vital Signs-Reviewed the patient's vital signs.   Patient Vitals for the past 12 hrs:   Temp Pulse Resp BP SpO2   11/22/20 1241  65 18 110/67 100 %   11/22/20 1106 97.9 °F (36.6 °C) 85 16 115/67 100 %       Records Reviewed: Nursing Notes    The patient presents with headache with a differential diagnosis of  cerebral hemorrhage, migraine, sinusitis and tension headache      ED Course:          Provider Notes (Medical Decision Making):     MDM  Number of Diagnoses or Management Options  Acute intractable headache, unspecified headache type: new, needed workup  Injury of head, initial encounter: new, needed workup     Amount and/or Complexity of Data Reviewed  Tests in the radiology section of CPT®: reviewed and ordered    Risk of Complications, Morbidity, and/or Mortality  Presenting problems: low  Diagnostic procedures: low  Management options: low    Patient Progress  Patient progress: improved         Procedures   Medical Decision Makingedical Decision Making  Performed by: Emigdio Dominguez NP  PROCEDURES:none  Procedures       Disposition   Disposition: DC- Adult Discharges: All of the diagnostic tests were reviewed and questions answered. Diagnosis, care plan and treatment options were discussed. The patient understands the instructions and will follow up as directed. The patients results have been reviewed with them. They have been counseled regarding their diagnosis. The patient verbally convey understanding and agreement of the signs, symptoms, diagnosis, treatment and prognosis and additionally agrees to follow up as recommended with their PCP in 24 - 48 hours. They also agree with the care-plan and convey that all of their questions have been answered. I have also put together some discharge instructions for them that include: 1) educational information regarding their diagnosis, 2) how to care for their diagnosis at home, as well a 3) list of reasons why they would want to return to the ED prior to their follow-up appointment, should their condition change. DISCHARGE PLAN:  1. Current Discharge Medication List      CONTINUE these medications which have NOT CHANGED    Details   ondansetron (ZOFRAN ODT) 4 mg disintegrating tablet Take 1 Tab by mouth every eight (8) hours as needed for Nausea. Qty: 8 Tab, Refills: 0      PARoxetine (PAXIL) 20 mg tablet Take 10 mg by mouth daily. acyclovir (ZOVIRAX) 200 mg capsule Take 200 mg by mouth every four (4) hours (while awake). cholecalciferol, vitamin D3, (VITAMIN D3) 2,000 unit tab Take  by mouth. 2.   Follow-up Information    None       3. Return to ED if worse   4. Current Discharge Medication List            Diagnosis     Clinical Impression: No diagnosis found.     Attestations:    Emigdio Dominguez NP    Please note that this dictation was completed with [employer], the [employer] voice recognition software. Quite often unanticipated grammatical, syntax, homophones, and other interpretive errors are inadvertently transcribed by the computer software. Please disregard these errors. Please excuse any errors that have escaped final proofreading. Thank you.

## 2020-12-09 ENCOUNTER — HOSPITAL ENCOUNTER (EMERGENCY)
Age: 30
Discharge: HOME | End: 2020-12-09
Attending: EMERGENCY MEDICINE

## 2020-12-09 VITALS
TEMPERATURE: 97.9 F | RESPIRATION RATE: 21 BRPM | DIASTOLIC BLOOD PRESSURE: 85 MMHG | BODY MASS INDEX: 19.35 KG/M2 | SYSTOLIC BLOOD PRESSURE: 118 MMHG | OXYGEN SATURATION: 100 % | HEART RATE: 74 BPM | HEIGHT: 59 IN | WEIGHT: 96 LBS

## 2020-12-09 DIAGNOSIS — G43.909 MIGRAINE WITHOUT STATUS MIGRAINOSUS, NOT INTRACTABLE, UNSPECIFIED MIGRAINE TYPE: Primary | ICD-10-CM

## 2020-12-09 PROCEDURE — 96374 THER/PROPH/DIAG INJ IV PUSH: CPT

## 2020-12-09 PROCEDURE — 99282 EMERGENCY DEPT VISIT SF MDM: CPT

## 2020-12-09 PROCEDURE — 74011250636 HC RX REV CODE- 250/636: Performed by: EMERGENCY MEDICINE

## 2020-12-09 PROCEDURE — 96375 TX/PRO/DX INJ NEW DRUG ADDON: CPT

## 2020-12-09 RX ORDER — BUTALBITAL, ACETAMINOPHEN AND CAFFEINE 300; 40; 50 MG/1; MG/1; MG/1
1 CAPSULE ORAL
Qty: 10 CAP | Refills: 0 | Status: SHIPPED | OUTPATIENT
Start: 2020-12-09

## 2020-12-09 RX ORDER — KETOROLAC TROMETHAMINE 30 MG/ML
30 INJECTION, SOLUTION INTRAMUSCULAR; INTRAVENOUS
Status: COMPLETED | OUTPATIENT
Start: 2020-12-09 | End: 2020-12-09

## 2020-12-09 RX ORDER — METOCLOPRAMIDE HYDROCHLORIDE 5 MG/ML
10 INJECTION INTRAMUSCULAR; INTRAVENOUS
Status: COMPLETED | OUTPATIENT
Start: 2020-12-09 | End: 2020-12-09

## 2020-12-09 RX ORDER — DIPHENHYDRAMINE HYDROCHLORIDE 50 MG/ML
25 INJECTION, SOLUTION INTRAMUSCULAR; INTRAVENOUS
Status: COMPLETED | OUTPATIENT
Start: 2020-12-09 | End: 2020-12-09

## 2020-12-09 RX ADMIN — KETOROLAC TROMETHAMINE 30 MG: 30 INJECTION, SOLUTION INTRAMUSCULAR at 07:13

## 2020-12-09 RX ADMIN — METOCLOPRAMIDE 10 MG: 5 INJECTION, SOLUTION INTRAMUSCULAR; INTRAVENOUS at 07:13

## 2020-12-09 RX ADMIN — SODIUM CHLORIDE 500 ML: 9 INJECTION, SOLUTION INTRAVENOUS at 07:12

## 2020-12-09 RX ADMIN — DIPHENHYDRAMINE HYDROCHLORIDE 25 MG: 50 INJECTION, SOLUTION INTRAMUSCULAR; INTRAVENOUS at 07:13

## 2020-12-09 NOTE — LETTER
[address] [place] [address] EMERGENCY DEPT 
[facility] [address] [place] [address] 
[phone] Work/School Note Date: 12/9/2020 To Whom It May concern: 
 
Arthur Rivas was seen and treated today in the emergency room by the following provider(s): 
Attending Provider: Erika Cassidy MD.   
 
Arthur Rivas is excused from work/school on 12/9/2020 through 12/12/2020. She is medically clear to return to work/school on 12/13/2020. Sincerely, 
 
 
 
Dr Karen Juarez
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
## 2021-10-24 ENCOUNTER — HOSPITAL ENCOUNTER (EMERGENCY)
Age: 31
Discharge: LEFT BEFORE DISCHARGE | End: 2021-10-24
Attending: EMERGENCY MEDICINE

## 2021-10-24 VITALS
SYSTOLIC BLOOD PRESSURE: 109 MMHG | RESPIRATION RATE: 14 BRPM | OXYGEN SATURATION: 100 % | DIASTOLIC BLOOD PRESSURE: 53 MMHG | HEART RATE: 75 BPM | TEMPERATURE: 98.1 F
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
## 2021-10-24 PROCEDURE — 75810000275 HC EMERGENCY DEPT VISIT NO LEVEL OF CARE
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
## 2021-10-27 ENCOUNTER — HOSPITAL ENCOUNTER (EMERGENCY)
Age: 31
Discharge: HOME | End: 2021-10-27
Attending: EMERGENCY MEDICINE | Admitting: EMERGENCY MEDICINE

## 2021-10-27 VITALS
DIASTOLIC BLOOD PRESSURE: 81 MMHG | SYSTOLIC BLOOD PRESSURE: 115 MMHG | TEMPERATURE: 97.2 F | HEART RATE: 83 BPM | RESPIRATION RATE: 16 BRPM | OXYGEN SATURATION: 100 %

## 2021-10-27 DIAGNOSIS — T74.21XD SEXUAL ASSAULT OF ADULT, SUBSEQUENT ENCOUNTER: Primary | ICD-10-CM

## 2021-10-27 PROCEDURE — 99282 EMERGENCY DEPT VISIT SF MDM: CPT
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
## 2021-10-27 NOTE — FORENSIC NURSE
FNE obtained a history from the patient. No further forensic services warranted at this time. [facility] advocate discussed resources with the patient. The patient denies any safety concerns at this time. CECILIO gave report to Katy Cordova RN using SBAR.
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
## 2024-05-09 ENCOUNTER — HOSPITAL ENCOUNTER (EMERGENCY)
Facility: HOSPITAL | Age: 34
Discharge: HOME | End: 2024-05-09

## 2024-05-09 ENCOUNTER — APPOINTMENT (OUTPATIENT)
Facility: HOSPITAL | Age: 34
End: 2024-05-09

## 2024-05-09 VITALS
WEIGHT: 94 LBS | HEIGHT: 59 IN | HEART RATE: 91 BPM | RESPIRATION RATE: 16 BRPM | DIASTOLIC BLOOD PRESSURE: 76 MMHG | TEMPERATURE: 98.1 F | OXYGEN SATURATION: 100 % | BODY MASS INDEX: 18.95 KG/M2 | SYSTOLIC BLOOD PRESSURE: 121 MMHG

## 2024-05-09 DIAGNOSIS — S80.212A ABRASION OF LEFT KNEE, INITIAL ENCOUNTER: ICD-10-CM

## 2024-05-09 DIAGNOSIS — S80.02XA CONTUSION OF LEFT KNEE, INITIAL ENCOUNTER: ICD-10-CM

## 2024-05-09 DIAGNOSIS — M25.562 ACUTE PAIN OF LEFT KNEE: Primary | ICD-10-CM

## 2024-05-09 PROCEDURE — 6370000000 HC RX 637 (ALT 250 FOR IP)

## 2024-05-09 PROCEDURE — 73562 X-RAY EXAM OF KNEE 3: CPT

## 2024-05-09 PROCEDURE — 99284 EMERGENCY DEPT VISIT MOD MDM: CPT

## 2024-05-09 PROCEDURE — 6360000002 HC RX W HCPCS

## 2024-05-09 PROCEDURE — 96372 THER/PROPH/DIAG INJ SC/IM: CPT

## 2024-05-09 RX ORDER — ACETAMINOPHEN 500 MG
1000 TABLET ORAL
Status: COMPLETED | OUTPATIENT
Start: 2024-05-09 | End: 2024-05-09

## 2024-05-09 RX ORDER — GINSENG 100 MG
CAPSULE ORAL 3 TIMES DAILY
Status: DISCONTINUED | OUTPATIENT
Start: 2024-05-09 | End: 2024-05-09 | Stop reason: HOSPADM

## 2024-05-09 RX ORDER — ACETAMINOPHEN 500 MG
1000 TABLET ORAL 3 TIMES DAILY PRN
Qty: 30 TABLET | Refills: 0 | Status: SHIPPED | OUTPATIENT
Start: 2024-05-09 | End: 2024-05-19

## 2024-05-09 RX ORDER — KETOROLAC TROMETHAMINE 15 MG/ML
15 INJECTION, SOLUTION INTRAMUSCULAR; INTRAVENOUS ONCE
Status: COMPLETED | OUTPATIENT
Start: 2024-05-09 | End: 2024-05-09

## 2024-05-09 RX ORDER — KETOROLAC TROMETHAMINE 10 MG/1
10 TABLET, FILM COATED ORAL EVERY 6 HOURS PRN
Qty: 20 TABLET | Refills: 0 | Status: SHIPPED | OUTPATIENT
Start: 2024-05-09

## 2024-05-09 RX ADMIN — BACITRACIN: 500 OINTMENT TOPICAL at 10:41

## 2024-05-09 RX ADMIN — KETOROLAC TROMETHAMINE 15 MG: 15 INJECTION, SOLUTION INTRAMUSCULAR; INTRAVENOUS at 10:29

## 2024-05-09 RX ADMIN — ACETAMINOPHEN 1000 MG: 500 TABLET ORAL at 10:28

## 2024-05-09 ASSESSMENT — PAIN DESCRIPTION - LOCATION: LOCATION: KNEE

## 2024-05-09 ASSESSMENT — PAIN SCALES - GENERAL: PAINLEVEL_OUTOF10: 8

## 2024-05-09 ASSESSMENT — PAIN - FUNCTIONAL ASSESSMENT: PAIN_FUNCTIONAL_ASSESSMENT: 0-10

## 2024-05-09 ASSESSMENT — PAIN DESCRIPTION - ORIENTATION: ORIENTATION: LEFT
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
## 2024-09-29 NOTE — ED NOTES
1:51 PM  I have evaluated the patient as the Provider in Triage. I have reviewed Her vital signs and the triage nurse assessment. I have talked with the patient and any available family and advised that I am the provider in triage and have ordered the appropriate study to initiate their work up based on the clinical presentation during my assessment. I have advised that the patient will be accommodated in the Main ED as soon as possible. I have also requested to contact the triage nurse or myself immediately if the patient experiences any changes in their condition during this brief waiting period. Patient states that she had an endoscopy done yesterday. Since then she has been vomiting and really nauseous. Denies fever/chills, diarrhea, bloody vomit or coffee ground emesis. Had the endoscopy for bowel issues and vomiting.  Is having upper abdominal pain since getting up this morning  Alton Dalton NP Labs today

## (undated) DEVICE — SOLIDIFIER FLUID 3000 CC ABSORB

## (undated) DEVICE — ENDO CARRY-ON PROCEDURE KIT INCLUDES ENZYMATIC SPONGE, GAUZE, BIOHAZARD LABEL, TRAY, LUBRICANT, DIRTY SCOPE LABEL, WATER LABEL, TRAY, DRAWSTRING PAD, AND DEFENDO 4-PIECE KIT.: Brand: ENDO CARRY-ON PROCEDURE KIT

## (undated) DEVICE — CANN NASAL O2 CAPNOGRAPHY AD -- FILTERLINE

## (undated) DEVICE — SET ADMIN 16ML TBNG L100IN 2 Y INJ SITE IV PIGGY BK DISP

## (undated) DEVICE — SYRINGE MED 20ML STD CLR PLAS LUERLOCK TIP N CTRL DISP

## (undated) DEVICE — 1200 GUARD II KIT W/5MM TUBE W/O VAC TUBE: Brand: GUARDIAN

## (undated) DEVICE — BAG BELONG PT PERS CLEAR HANDL

## (undated) DEVICE — KENDALL RADIOLUCENT FOAM MONITORING ELECTRODE -RECTANGULAR SHAPE: Brand: KENDALL

## (undated) DEVICE — BW-412T DISP COMBO CLEANING BRUSH: Brand: SINGLE USE COMBINATION CLEANING BRUSH

## (undated) DEVICE — KIT IV STRT W CHLORAPREP PD 1ML

## (undated) DEVICE — BAG SPEC BIOHZD LF 2MIL 6X10IN -- CONVERT TO ITEM 357326

## (undated) DEVICE — CATH IV AUTOGRD BC BLU 22GA 25 -- INSYTE

## (undated) DEVICE — CONTAINER SPEC 20 ML LID NEUT BUFF FORMALIN 10 % POLYPR STS

## (undated) DEVICE — NEEDLE HYPO 18GA L1.5IN PNK S STL HUB POLYPR SHLD REG BVL

## (undated) DEVICE — FORCEPS BX L240CM JAW DIA2.8MM L CAP W/ NDL MIC MESH TOOTH

## (undated) DEVICE — Z DISCONTINUED NO SUB IDED SET EXTN W/ 4 W STPCOCK M SPIN LOK 36IN